# Patient Record
Sex: MALE | Race: WHITE | NOT HISPANIC OR LATINO | Employment: OTHER | URBAN - METROPOLITAN AREA
[De-identification: names, ages, dates, MRNs, and addresses within clinical notes are randomized per-mention and may not be internally consistent; named-entity substitution may affect disease eponyms.]

---

## 2023-06-09 ENCOUNTER — LAB (OUTPATIENT)
Dept: LAB | Facility: LAB | Age: 88
End: 2023-06-09
Payer: MEDICARE

## 2023-06-09 ENCOUNTER — OFFICE VISIT (OUTPATIENT)
Dept: PRIMARY CARE | Facility: CLINIC | Age: 88
End: 2023-06-09
Payer: MEDICARE

## 2023-06-09 VITALS
OXYGEN SATURATION: 88 % | WEIGHT: 161.8 LBS | HEART RATE: 74 BPM | BODY MASS INDEX: 23.89 KG/M2 | DIASTOLIC BLOOD PRESSURE: 62 MMHG | SYSTOLIC BLOOD PRESSURE: 113 MMHG

## 2023-06-09 DIAGNOSIS — Z00.00 WELLNESS EXAMINATION: Primary | ICD-10-CM

## 2023-06-09 DIAGNOSIS — I10 HYPERTENSION, UNSPECIFIED TYPE: ICD-10-CM

## 2023-06-09 DIAGNOSIS — D84.9 IMMUNOSUPPRESSED STATUS (MULTI): ICD-10-CM

## 2023-06-09 DIAGNOSIS — J44.9 CHRONIC OBSTRUCTIVE PULMONARY DISEASE, UNSPECIFIED COPD TYPE (MULTI): ICD-10-CM

## 2023-06-09 DIAGNOSIS — J84.9 ILD (INTERSTITIAL LUNG DISEASE) (MULTI): ICD-10-CM

## 2023-06-09 DIAGNOSIS — D69.6 LOW PLATELET COUNT (CMS-HCC): ICD-10-CM

## 2023-06-09 DIAGNOSIS — Z99.81 CHRONIC RESPIRATORY FAILURE WITH HYPOXIA, ON HOME O2 THERAPY (MULTI): ICD-10-CM

## 2023-06-09 DIAGNOSIS — J96.11 CHRONIC RESPIRATORY FAILURE WITH HYPOXIA, ON HOME O2 THERAPY (MULTI): ICD-10-CM

## 2023-06-09 LAB
ALANINE AMINOTRANSFERASE (SGPT) (U/L) IN SER/PLAS: 23 U/L (ref 10–52)
ALBUMIN (G/DL) IN SER/PLAS: 3.6 G/DL (ref 3.4–5)
ALKALINE PHOSPHATASE (U/L) IN SER/PLAS: 76 U/L (ref 33–136)
ANION GAP IN SER/PLAS: 11 MMOL/L (ref 10–20)
ASPARTATE AMINOTRANSFERASE (SGOT) (U/L) IN SER/PLAS: 26 U/L (ref 9–39)
BILIRUBIN TOTAL (MG/DL) IN SER/PLAS: 1.3 MG/DL (ref 0–1.2)
CALCIUM (MG/DL) IN SER/PLAS: 9.2 MG/DL (ref 8.6–10.6)
CARBON DIOXIDE, TOTAL (MMOL/L) IN SER/PLAS: 31 MMOL/L (ref 21–32)
CHLORIDE (MMOL/L) IN SER/PLAS: 104 MMOL/L (ref 98–107)
CREATININE (MG/DL) IN SER/PLAS: 0.93 MG/DL (ref 0.5–1.3)
ERYTHROCYTE DISTRIBUTION WIDTH (RATIO) BY AUTOMATED COUNT: 16 % (ref 11.5–14.5)
ERYTHROCYTE MEAN CORPUSCULAR HEMOGLOBIN CONCENTRATION (G/DL) BY AUTOMATED: 32.5 G/DL (ref 32–36)
ERYTHROCYTE MEAN CORPUSCULAR VOLUME (FL) BY AUTOMATED COUNT: 88 FL (ref 80–100)
ERYTHROCYTES (10*6/UL) IN BLOOD BY AUTOMATED COUNT: 5.51 X10E12/L (ref 4.5–5.9)
GFR MALE: 77 ML/MIN/1.73M2
GLUCOSE (MG/DL) IN SER/PLAS: 78 MG/DL (ref 74–99)
HEMATOCRIT (%) IN BLOOD BY AUTOMATED COUNT: 48.3 % (ref 41–52)
HEMOGLOBIN (G/DL) IN BLOOD: 15.7 G/DL (ref 13.5–17.5)
LEUKOCYTES (10*3/UL) IN BLOOD BY AUTOMATED COUNT: 10.5 X10E9/L (ref 4.4–11.3)
NRBC (PER 100 WBCS) BY AUTOMATED COUNT: 0 /100 WBC (ref 0–0)
PLATELETS (10*3/UL) IN BLOOD AUTOMATED COUNT: 274 X10E9/L (ref 150–450)
POTASSIUM (MMOL/L) IN SER/PLAS: 4.1 MMOL/L (ref 3.5–5.3)
PROTEIN TOTAL: 6 G/DL (ref 6.4–8.2)
SODIUM (MMOL/L) IN SER/PLAS: 142 MMOL/L (ref 136–145)
UREA NITROGEN (MG/DL) IN SER/PLAS: 17 MG/DL (ref 6–23)

## 2023-06-09 PROCEDURE — G0439 PPPS, SUBSEQ VISIT: HCPCS | Performed by: INTERNAL MEDICINE

## 2023-06-09 PROCEDURE — 1170F FXNL STATUS ASSESSED: CPT | Performed by: INTERNAL MEDICINE

## 2023-06-09 PROCEDURE — 80053 COMPREHEN METABOLIC PANEL: CPT

## 2023-06-09 PROCEDURE — 99214 OFFICE O/P EST MOD 30 MIN: CPT | Performed by: INTERNAL MEDICINE

## 2023-06-09 PROCEDURE — 1036F TOBACCO NON-USER: CPT | Performed by: INTERNAL MEDICINE

## 2023-06-09 PROCEDURE — 3074F SYST BP LT 130 MM HG: CPT | Performed by: INTERNAL MEDICINE

## 2023-06-09 PROCEDURE — 1159F MED LIST DOCD IN RCRD: CPT | Performed by: INTERNAL MEDICINE

## 2023-06-09 PROCEDURE — 99497 ADVNCD CARE PLAN 30 MIN: CPT | Performed by: INTERNAL MEDICINE

## 2023-06-09 PROCEDURE — 36415 COLL VENOUS BLD VENIPUNCTURE: CPT

## 2023-06-09 PROCEDURE — 3078F DIAST BP <80 MM HG: CPT | Performed by: INTERNAL MEDICINE

## 2023-06-09 PROCEDURE — 85027 COMPLETE CBC AUTOMATED: CPT

## 2023-06-09 PROCEDURE — 1160F RVW MEDS BY RX/DR IN RCRD: CPT | Performed by: INTERNAL MEDICINE

## 2023-06-09 RX ORDER — PREDNISONE 5 MG/1
2 TABLET ORAL
COMMUNITY
End: 2023-10-17 | Stop reason: ALTCHOICE

## 2023-06-09 RX ORDER — FUROSEMIDE 20 MG/1
1 TABLET ORAL DAILY PRN
COMMUNITY
Start: 2017-02-28

## 2023-06-09 RX ORDER — MOMETASONE FUROATE 220 UG/1
2 INHALANT RESPIRATORY (INHALATION)
COMMUNITY

## 2023-06-09 RX ORDER — PSYLLIUM SEED
PACKET (EA) ORAL
COMMUNITY
Start: 2015-08-18

## 2023-06-09 RX ORDER — NAPROXEN SODIUM 220 MG/1
81 TABLET, FILM COATED ORAL
COMMUNITY
Start: 2014-03-29

## 2023-06-09 RX ORDER — VIT C/E/ZN/COPPR/LUTEIN/ZEAXAN 250MG-90MG
CAPSULE ORAL
COMMUNITY
Start: 2021-03-24

## 2023-06-09 RX ORDER — ALBUTEROL SULFATE 0.83 MG/ML
SOLUTION RESPIRATORY (INHALATION)
COMMUNITY
Start: 2017-02-15

## 2023-06-09 RX ORDER — PANTOPRAZOLE SODIUM 40 MG/1
TABLET, DELAYED RELEASE ORAL
COMMUNITY
Start: 2018-07-24

## 2023-06-09 RX ORDER — ATORVASTATIN CALCIUM 40 MG/1
40 TABLET, FILM COATED ORAL
COMMUNITY
Start: 2015-08-18

## 2023-06-09 ASSESSMENT — ACTIVITIES OF DAILY LIVING (ADL)
DRESSING: INDEPENDENT
MANAGING_FINANCES: INDEPENDENT
DOING_HOUSEWORK: INDEPENDENT
GROCERY_SHOPPING: INDEPENDENT
TAKING_MEDICATION: INDEPENDENT
BATHING: INDEPENDENT

## 2023-06-09 ASSESSMENT — PATIENT HEALTH QUESTIONNAIRE - PHQ9
2. FEELING DOWN, DEPRESSED OR HOPELESS: NOT AT ALL
1. LITTLE INTEREST OR PLEASURE IN DOING THINGS: NOT AT ALL
SUM OF ALL RESPONSES TO PHQ9 QUESTIONS 1 AND 2: 0
SUM OF ALL RESPONSES TO PHQ9 QUESTIONS 1 AND 2: 0
2. FEELING DOWN, DEPRESSED OR HOPELESS: NOT AT ALL
1. LITTLE INTEREST OR PLEASURE IN DOING THINGS: NOT AT ALL

## 2023-06-09 NOTE — PROGRESS NOTES
Subjective   Patient ID: Donavan Hernández is a 89 y.o. male who presents for the following    MEDICARE WELLNESS 6/9/2023 AND FOLLOW UP     PHYSICAL 8/8/2022    Assessment/Plan      HTN: stable, continue with diet modifications     HLD: stable, continue on statin      CAD: stable, continue to check lipid panel and follow up with cardiology     COPD/ILD/CHronic hypoxia/immunosuppression: stable, continue home O2 3 liters, increase to 5L with exertion and continue 10mg prednisone as needed. continue on Symbicort. doing well.        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 will continue to monitor plts.      GERD: stable, continue PPI      labs reviewed with patient     patient wants full code at this time.   His wife is with him today and says that he does not want to be in a vegetative state ever.   Advanced care planning was discussed for 20 mins  DIscussed in details the options of advanced directives with patient in a voluntary nature. We discussed the differences between full code, comfort care arrest and comfort care. Patient was educated in detail regarding end-of-life care including options for hospice and palliative care. I provided details to patient regarding the importance of creating both a Living Will and Power of  documents and encouraged patient to complete these documents.    HPI    male with COPD, chronic hypoxia on 3 liters of oxygen, CAD s/p stent, htn, hld and the following      HTN: patient denies any headaches, blurred vision or dizziness. patient denies any stroke like symptoms     HLD: Patient denies any muscle aches and is tolerating statin therapy     CAD: denies any angina. denies any sob chest pain or pressure especially with exertion. 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 he follows with dr zuniga. he mentions that he does get hospitalized for lung disease. he quite in 1985 smoking, but he previously smoke 1ppd for 30 years.   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 patient went to dr kennedy for kyphoplasty but deferred on this procedure. patient currently does not take any pain relievers      ITP: required treatment back in 2013 with dr khan. he has been in remission for years.      GERD: Patient denies any acid reflux symptoms including epigastric burning, nausea or vomiting. Denies any dysphagia.     social: patient denies any smoking, drug or alcohol abuse     family history: dad passed 44 from lung cancer, mom passed old age     surgical history: s/p two lung surgeries looking for cancer which was not cancer, hernia on the right repair      HOSPITALIZATIONS     went [facility] and discharged on jan 18, 2023 with COPD exacerbation on levaquin and po steroids and did welll. he had DARRON which improved quickly during the hospitalization. he is doing well on his portable oxygen. no complaints or ROS at this time.   There were no vitals taken for this visit.  PHYSICAL EXAM   General appearance: Alert and in no acute distress. speech is clear and coherent  HEENT: Sclera and conjunctiva white, EOMI,  n. No head trauma  Neck: no carotid bruits or thyromegaly. no lymphadenopathy   Respiratory : No respiratory distress, normal respiratory rhythm and effort. Clear bilateral breath sounds. No wheezing or rhonchi.   Cardiovascular: heart rate regular, S1, S2. no murmurs. no Lower extremity edema  Skin inspection: Normal skin color and pigmentation, normal skin turgor and no visible rash,  induration, or cellulitis  MSK: 5/5 strength upper and lower extremities without gait abnormalities. no loss of muscle mass   Neuro: 2-12 CN grossly intact.  no slurred speech. no lateralizing deficit  Psychiatric Orientation: Oriented to person, place, and time. no depression, homicidal or suicidal thoughts, normal affect       REVIEW OF SYSTEMS     Constitutional: not feeling tired and no fever, chills or sweats.  no headache  Cardiovascular: no exertional chest pain, no palpitations, no lower extremity edema and no intermittent leg claudication.   Lungs: Denies shortness of breath, exertional dyspnea, wheezing  Gastrointestinal: no change in bowel habits, no diarrhea, no nausea,    Musculoskeletal: no myalgias, no muscle weakness    Neurological: no headaches, no seizures, no numbness, no lateralizing deficits and no fainting.   Psychiatric: no depression and no anxiety.   Urine: denies polyuria, hematuria, dysuria      Not on File    No current outpatient medications on file.     No current facility-administered medications for this visit.       Objective     No visits with results within 4 Month(s) from this visit.   Latest known visit with results is:   No results found for any previous visit.       Radiology: Reviewed imaging in powerchart.  No results found.    No family history on file.  Social History     Socioeconomic History    Marital status:      Spouse name: Not on file    Number of children: Not on file    Years of education: Not on file    Highest education level: Not on file   Occupational History    Not on file   Tobacco Use    Smoking status: Not on file    Smokeless tobacco: Not on file   Vaping Use    Vaping status: Not on file   Substance and Sexual Activity    Alcohol use: Not on file    Drug use: Not on file    Sexual activity: Not on file   Other Topics Concern    Not on file   Social History Narrative    Not on file     Social Determinants of Health     Financial Resource Strain:  Not on file   Food Insecurity: Not on file   Transportation Needs: Not on file   Physical Activity: Not on file   Stress: Not on file   Social Connections: Not on file   Intimate Partner Violence: Not on file   Housing Stability: Not on file     Past Medical History:   Diagnosis Date    Personal history of other malignant neoplasm of bronchus and lung     History of malignant neoplasm of lung     Past Surgical History:   Procedure Laterality Date    FOOT SURGERY  03/24/2021    Foot Surgery    HERNIA REPAIR  03/24/2021    Inguinal Hernia Repair    KNEE SURGERY  03/24/2021    Knee Surgery    OTHER SURGICAL HISTORY  03/24/2021    Colonoscopy    OTHER SURGICAL HISTORY  03/24/2021    Previous Stent Placement    OTHER SURGICAL HISTORY  03/24/2021    Thoracoscopy Of The Lungs And Pleural Space    TONSILLECTOMY  03/24/2021    Tonsillectomy       Charting was completed using voice recognition technology and may include unintended errors.

## 2023-08-29 ENCOUNTER — TELEPHONE (OUTPATIENT)
Dept: PRIMARY CARE | Facility: CLINIC | Age: 88
End: 2023-08-29
Payer: MEDICARE

## 2023-08-29 NOTE — TELEPHONE ENCOUNTER
Daughter called requesting [facility]  He was int [facility] and I am showing his potassium to be at 4.3 which is WNL.     No Rx will be sent  They can discuss further questions at the [facility] appt next week

## 2023-09-05 ENCOUNTER — OFFICE VISIT (OUTPATIENT)
Dept: PRIMARY CARE | Facility: CLINIC | Age: 88
End: 2023-09-05
Payer: MEDICARE

## 2023-09-05 VITALS
DIASTOLIC BLOOD PRESSURE: 58 MMHG | OXYGEN SATURATION: 87 % | HEIGHT: 69 IN | WEIGHT: 159.2 LBS | BODY MASS INDEX: 23.58 KG/M2 | HEART RATE: 78 BPM | TEMPERATURE: 98.3 F | SYSTOLIC BLOOD PRESSURE: 118 MMHG

## 2023-09-05 DIAGNOSIS — J44.9 CHRONIC OBSTRUCTIVE PULMONARY DISEASE, UNSPECIFIED COPD TYPE (MULTI): Primary | ICD-10-CM

## 2023-09-05 DIAGNOSIS — R60.0 LOWER EXTREMITY EDEMA: ICD-10-CM

## 2023-09-05 DIAGNOSIS — C44.92 SQUAMOUS CELL SKIN CANCER: ICD-10-CM

## 2023-09-05 DIAGNOSIS — R73.01 IMPAIRED FASTING GLUCOSE: ICD-10-CM

## 2023-09-05 DIAGNOSIS — N18.31 STAGE 3A CHRONIC KIDNEY DISEASE (MULTI): ICD-10-CM

## 2023-09-05 PROCEDURE — 1159F MED LIST DOCD IN RCRD: CPT | Performed by: INTERNAL MEDICINE

## 2023-09-05 PROCEDURE — 3074F SYST BP LT 130 MM HG: CPT | Performed by: INTERNAL MEDICINE

## 2023-09-05 PROCEDURE — 99496 TRANSJ CARE MGMT HIGH F2F 7D: CPT | Performed by: INTERNAL MEDICINE

## 2023-09-05 PROCEDURE — 1036F TOBACCO NON-USER: CPT | Performed by: INTERNAL MEDICINE

## 2023-09-05 PROCEDURE — 3078F DIAST BP <80 MM HG: CPT | Performed by: INTERNAL MEDICINE

## 2023-09-05 PROCEDURE — 1160F RVW MEDS BY RX/DR IN RCRD: CPT | Performed by: INTERNAL MEDICINE

## 2023-09-05 NOTE — PROGRESS NOTES
Subjective   Patient ID: Donavan Hernández is a 89 y.o. male who presents for the following    TRANSITIONS OF CARE here with Flor (daughter)    MEDICARE WELLNESS 6/9/2023 AND FOLLOW UP     PHYSICAL 8/8/2022    Assessment/Plan    COPD/ILD/CHronic hypoxia/immunosuppression: stable, continue home O2 3 liters, increase to 5L with exertion and continue 10mg prednisone as needed. continue on Symbicort. doing well.   Reduce to 10mg prednisone    Ckd stage 3 GFR 40-50s: Lower extremity edema: increase from 20mg to 40mg lasix for 5 days then reduce down     Squamous cell CA of the scalp. Dr King. [phone], will call on behalf of patient to get patient an appointment    Chronic back pain/ thoracic compression fractures: tylenol 1000mg q 8hrs    HTN: stable, continue with diet modifications     HLD: stable, continue on statin      CAD: stable, continue to check lipid panel and follow up with cardiology     history of ITP: stable and in remission for years now. will continue to monitor plts.      GERD: stable, continue PPI      labs reviewed with patient     patient wants full code at this time.      Discussed with patient about possibly going to assisted living or at least     patient was recently seen in the hospital and patient's hospital record has been reviewed with the patient including, but not limited to, discharge summary, labs, imaging, consultation notes (if pertinent). Ambulatory medication list and discharge hospitalization list has been compared and updated for changes.      HPI    male with COPD, chronic hypoxia on 3 liters of oxygen, CAD s/p stent, htn, hld and the following    @[facility] patient was hospitalized from 8/25/23-8/28/2023 for sepsis, acute on chronic hypoxemic= respiratory failure and COPD exacerbation patient was treated with steroids and antibiotics. He improved and was placed on a steroid taper 60mg,40, 20mg taper down every 3 days of prednisone and then to continue on 10mg prednisone    He does not  feel that he can take care of himself. Lower extremity edema nad he is on 20mg lasix       He can't seem to get into his appointment with dr lopez for his squamous cell cancer on his scalp     HTN: patient denies any headaches, blurred vision or dizziness. patient denies any stroke like symptoms     HLD: Patient denies any muscle aches and is tolerating statin therapy     CAD: denies any angina. denies any sob chest pain or pressure especially with exertion. 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 he follows with dr zuniga. he mentions that he does get hospitalized for lung disease. he quite in 1985 smoking, but he previously smoke 1ppd for 30 years.   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 patient went to dr kennedy for kyphoplasty but deferred on this procedure. patient currently does not take any pain relievers      ITP: required treatment back in 2013 with dr khan. he has been in remission for years.      GERD: Patient denies any acid reflux symptoms including epigastric burning, nausea or vomiting. Denies any dysphagia.     social: patient denies any smoking, drug or alcohol abuse     family history: dad passed 44 from lung cancer, mom passed old age     surgical history: s/p two lung surgeries looking for cancer which was not cancer, hernia on the right repair      HOSPITALIZATIONS     went [facility] and discharged on jan 18, 2023 with COPD exacerbation on levaquin and po steroids and did welll. he had DARRON which improved quickly during the hospitalization. he is doing well on his portable oxygen. no complaints or ROS at this time.   Visit Vitals  /58 (BP Location: Left arm, Patient Position: Sitting)   Pulse 78  "  Temp 36.8 °C (98.3 °F)   Ht 1.753 m (5' 9\")   Wt 72.2 kg (159 lb 3.2 oz)   SpO2 (!) 87% Comment: 3l nc   BMI 23.51 kg/m²   Smoking Status Former   BSA 1.88 m²     PHYSICAL EXAM   General appearance: Alert and in no acute distress. speech is clear and coherent  HEENT: Sclera and conjunctiva white, EOMI,  n. No head trauma  Neck: no carotid bruits or thyromegaly. no lymphadenopathy   Respiratory : No respiratory distress, normal respiratory rhythm and effort. Clear bilateral breath sounds. No wheezing or rhonchi.   Cardiovascular: heart rate regular, S1, S2. no murmurs. Trace to +1  Lower extremity edema  Skin inspection: Normal skin color and pigmentation, normal skin turgor and no visible rash, induration, or cellulitis  MSK: 3/5 strength upper and lower extremities with gait abnormalities. no loss of muscle mass   Neuro: 2-12 CN grossly intact.  no slurred speech. no lateralizing deficit  Psychiatric Orientation: Oriented to person, place, and time. no depression, homicidal or suicidal thoughts, normal affect       REVIEW OF SYSTEMS     Constitutional: not feeling tired and no fever, chills or sweats.  no headache  Cardiovascular: no exertional chest pain, no palpitations, no lower extremity edema and no intermittent leg claudication.   Lungs: Denies shortness of breath, exertional dyspnea, wheezing  Gastrointestinal: no change in bowel habits, no diarrhea, no nausea,    Musculoskeletal: no myalgias, no muscle weakness    Neurological: no headaches, no seizures, no numbness, no lateralizing deficits and no fainting.   Psychiatric: no depression and no anxiety.   Urine: denies polyuria, hematuria, dysuria      No Known Allergies    Current Outpatient Medications   Medication Sig Dispense Refill    albuterol 2.5 mg /3 mL (0.083 %) nebulizer solution Inhale.      aspirin 81 mg chewable tablet 1 tablet (81 mg).      atorvastatin (Lipitor) 40 mg tablet 1 tablet (40 mg).      furosemide (Lasix) 20 mg tablet Take 1 " tablet (20 mg) by mouth once daily as needed.      mometasone (Asmanex Twisthaler) 220 mcg/ actuation (14) inhaler Inhale 2 puffs once daily. Rinse mouth with water after use to reduce aftertaste and incidence of candidiasis. Do not swallow.      pantoprazole (ProtoNix) 40 mg EC tablet Take by mouth.      predniSONE 5 mg tablets,dose pack Take 2 tablets by mouth.      psyllium husk (MetamuciL) 3.4 gram/5.4 gram powder Take by mouth.      tiotropium-olodateroL (Stiolto Respimat) 2.5-2.5 mcg/actuation mist inhaler       vit C,R-Gz-xqxtd-lutein-zeaxan (PreserVision AREDS-2) 250-90-40-1 mg tablet,chewable Chew.       No current facility-administered medications for this visit.       Objective     Lab on 06/09/2023   Component Date Value Ref Range Status    WBC 06/09/2023 10.5  4.4 - 11.3 x10E9/L Final    nRBC 06/09/2023 0.0  0.0 - 0.0 /100 WBC Final    RBC 06/09/2023 5.51  4.50 - 5.90 x10E12/L Final    Hemoglobin 06/09/2023 15.7  13.5 - 17.5 g/dL Final    Hematocrit 06/09/2023 48.3  41.0 - 52.0 % Final    MCV 06/09/2023 88  80 - 100 fL Final    MCHC 06/09/2023 32.5  32.0 - 36.0 g/dL Final    Platelets 06/09/2023 274  150 - 450 x10E9/L Final    RDW 06/09/2023 16.0 (H)  11.5 - 14.5 % Final    Glucose 06/09/2023 78  74 - 99 mg/dL Final    Sodium 06/09/2023 142  136 - 145 mmol/L Final    Potassium 06/09/2023 4.1  3.5 - 5.3 mmol/L Final    Chloride 06/09/2023 104  98 - 107 mmol/L Final    Bicarbonate 06/09/2023 31  21 - 32 mmol/L Final    Anion Gap 06/09/2023 11  10 - 20 mmol/L Final    Urea Nitrogen 06/09/2023 17  6 - 23 mg/dL Final    Creatinine 06/09/2023 0.93  0.50 - 1.30 mg/dL Final    GFR MALE 06/09/2023 77  >90 mL/min/1.73m2 Final    Calcium 06/09/2023 9.2  8.6 - 10.6 mg/dL Final    Albumin 06/09/2023 3.6  3.4 - 5.0 g/dL Final    Alkaline Phosphatase 06/09/2023 76  33 - 136 U/L Final    Total Protein 06/09/2023 6.0 (L)  6.4 - 8.2 g/dL Final    AST 06/09/2023 26  9 - 39 U/L Final    Total Bilirubin 06/09/2023 1.3 (H)   0.0 - 1.2 mg/dL Final    ALT (SGPT) 2023 23  10 - 52 U/L Final       Radiology: Reviewed imaging in powerchart.  No results found.    No family history on file.  Social History     Socioeconomic History    Marital status:      Spouse name: None    Number of children: None    Years of education: None    Highest education level: None   Occupational History    None   Tobacco Use    Smoking status: Former     Packs/day: 1     Types: Cigarettes     Quit date:      Years since quittin.7    Smokeless tobacco: Former   Substance and Sexual Activity    Alcohol use: Never    Drug use: Never    Sexual activity: None   Other Topics Concern    None   Social History Narrative    None     Social Determinants of Health     Financial Resource Strain: Not on file   Food Insecurity: Not on file   Transportation Needs: Not on file   Physical Activity: Not on file   Stress: Not on file   Social Connections: Not on file   Intimate Partner Violence: Not on file   Housing Stability: Not on file     Past Medical History:   Diagnosis Date    Personal history of other malignant neoplasm of bronchus and lung     History of malignant neoplasm of lung     Past Surgical History:   Procedure Laterality Date    FOOT SURGERY  2021    Foot Surgery    HERNIA REPAIR  2021    Inguinal Hernia Repair    KNEE SURGERY  2021    Knee Surgery    OTHER SURGICAL HISTORY  2021    Colonoscopy    OTHER SURGICAL HISTORY  2021    Previous Stent Placement    OTHER SURGICAL HISTORY  2021    Thoracoscopy Of The Lungs And Pleural Space    TONSILLECTOMY  2021    Tonsillectomy       Charting was completed using voice recognition technology and may include unintended errors.

## 2023-09-08 ENCOUNTER — TELEPHONE (OUTPATIENT)
Dept: PRIMARY CARE | Facility: CLINIC | Age: 88
End: 2023-09-08
Payer: MEDICARE

## 2023-09-08 NOTE — TELEPHONE ENCOUNTER
[facility] called asking if Dr. Owens will follow patient for PT services. They would like a call back with answer [phone].

## 2023-09-11 ENCOUNTER — TELEPHONE (OUTPATIENT)
Dept: PRIMARY CARE | Facility: CLINIC | Age: 88
End: 2023-09-11
Payer: MEDICARE

## 2023-09-12 ENCOUNTER — TELEPHONE (OUTPATIENT)
Dept: PRIMARY CARE | Facility: CLINIC | Age: 88
End: 2023-09-12
Payer: MEDICARE

## 2023-09-12 NOTE — TELEPHONE ENCOUNTER
Dona from [facility] called with concern for patients pain that seems to be pelvic region. Pain is noticed more with positional changes. Looks like pt had pelvic xray done yesterday, she is seeing patient today. Called to see if any recommendations or concerns for pain.

## 2023-09-13 ENCOUNTER — TELEPHONE (OUTPATIENT)
Dept: PRIMARY CARE | Facility: CLINIC | Age: 88
End: 2023-09-13
Payer: MEDICARE

## 2023-09-13 DIAGNOSIS — B37.81 THRUSH OF MOUTH AND ESOPHAGUS (MULTI): ICD-10-CM

## 2023-09-13 DIAGNOSIS — B37.0 THRUSH OF MOUTH AND ESOPHAGUS (MULTI): ICD-10-CM

## 2023-09-13 RX ORDER — NYSTATIN 100000 [USP'U]/ML
10 SUSPENSION ORAL 4 TIMES DAILY
Qty: 280 ML | Refills: 0 | Status: SHIPPED
Start: 2023-09-13 | End: 2023-09-15 | Stop reason: SDUPTHER

## 2023-09-13 NOTE — TELEPHONE ENCOUNTER
Informed Dona pt already had xray complete- informed her pt can see Dr. Yañez for pain or Dr. Owens in next 2-3 weeks. She stated she has seen improvement in pt since last week to this week but she will let the pt know.

## 2023-09-13 NOTE — TELEPHONE ENCOUNTER
PT BANGED HIS FOREARM ON WALKER. HE HAS A SKIN TEAR AND IT IS BEING TREATED WITH AQUAPHOR, SOAP & WATER, AND NON STICK WRAP. IS THIS OK FOR WOUND CARE?  ALSO COMPLAINING OF SORENESS IN MOUTH, BUT DOES NOT SEE ANYTHING.    CALL LONA AT  HC: [phone]  CALL PT ALSO TO LET HIM KNOW.

## 2023-09-13 NOTE — TELEPHONE ENCOUNTER
Called msg for home health nurse (Brando) indicating wound care orders were appropriate per Dr Owens r/t skin tear    Called pt and his mouth is burning when eating acidic foods  He does use inhalers and I will request antifungal  Pt understands that provider needs to approve before sent tp pharmacy

## 2023-09-15 RX ORDER — NYSTATIN 100000 [USP'U]/ML
10 SUSPENSION ORAL 4 TIMES DAILY
Qty: 280 ML | Refills: 0 | Status: SHIPPED | OUTPATIENT
Start: 2023-09-15 | End: 2023-11-14

## 2023-09-15 NOTE — TELEPHONE ENCOUNTER
Please resend script for mycostatin swish and spit to [facility]  This was sent to [facility]

## 2023-10-11 ENCOUNTER — TELEPHONE (OUTPATIENT)
Dept: PRIMARY CARE | Facility: CLINIC | Age: 88
End: 2023-10-11
Payer: MEDICARE

## 2023-10-11 NOTE — TELEPHONE ENCOUNTER
OLY made for pt who was discharged on 10/10/23  He received his meds and has no immediate need to see or talk to the attending physician. Pt is stable at this time  Appt made for Oct 17 2023 at 11am

## 2023-10-17 ENCOUNTER — TELEPHONE (OUTPATIENT)
Dept: PRIMARY CARE | Facility: CLINIC | Age: 88
End: 2023-10-17

## 2023-10-17 ENCOUNTER — OFFICE VISIT (OUTPATIENT)
Dept: PRIMARY CARE | Facility: CLINIC | Age: 88
End: 2023-10-17
Payer: MEDICARE

## 2023-10-17 VITALS
TEMPERATURE: 97.1 F | HEART RATE: 84 BPM | OXYGEN SATURATION: 96 % | SYSTOLIC BLOOD PRESSURE: 78 MMHG | DIASTOLIC BLOOD PRESSURE: 50 MMHG

## 2023-10-17 DIAGNOSIS — J44.9 CHRONIC OBSTRUCTIVE PULMONARY DISEASE, UNSPECIFIED COPD TYPE (MULTI): Primary | ICD-10-CM

## 2023-10-17 PROCEDURE — 99497 ADVNCD CARE PLAN 30 MIN: CPT | Performed by: INTERNAL MEDICINE

## 2023-10-17 PROCEDURE — 1160F RVW MEDS BY RX/DR IN RCRD: CPT | Performed by: INTERNAL MEDICINE

## 2023-10-17 PROCEDURE — 99495 TRANSJ CARE MGMT MOD F2F 14D: CPT | Performed by: INTERNAL MEDICINE

## 2023-10-17 PROCEDURE — 3078F DIAST BP <80 MM HG: CPT | Performed by: INTERNAL MEDICINE

## 2023-10-17 PROCEDURE — 1159F MED LIST DOCD IN RCRD: CPT | Performed by: INTERNAL MEDICINE

## 2023-10-17 PROCEDURE — 1036F TOBACCO NON-USER: CPT | Performed by: INTERNAL MEDICINE

## 2023-10-17 PROCEDURE — 3074F SYST BP LT 130 MM HG: CPT | Performed by: INTERNAL MEDICINE

## 2023-10-17 RX ORDER — PREDNISONE 20 MG/1
TABLET ORAL
Qty: 18 TABLET | Refills: 1 | Status: SHIPPED | OUTPATIENT
Start: 2023-10-17

## 2023-10-17 RX ORDER — AZITHROMYCIN 250 MG/1
TABLET, FILM COATED ORAL
Qty: 6 TABLET | Refills: 0 | Status: SHIPPED | OUTPATIENT
Start: 2023-10-17 | End: 2023-10-22

## 2023-10-17 NOTE — PROGRESS NOTES
Subjective   Patient ID: Donavan Hernández is a 89 y.o. male who presents for the following    TRANSITIONS OF CARE here with Diana (verena RN present)   Flor (daughter - not present)    MEDICARE WELLNESS 6/9/2023       PHYSICAL 8/8/2022     Assessment/Plan   Low bp but not symptomatic at all.   Midodrine may need to be necessary in the near future.      COPD/ILD/CHronic hypoxia/immunosuppression: stable, continue home O2 3 liters, increase to 5L with exertion and continue 10mg prednisone as needed. continue on Symbicort. doing well.   Reduce to 10mg prednisone    Ckd stage 3 GFR 40-50s: Lower extremity edema: increase from 20mg to 40mg lasix for 5 days then reduce down     Squamous cell CA of the scalp. Dr King. [phone], will call on behalf of patient to get patient an appointment    Chronic back pain/ thoracic compression fractures: tylenol 1000mg q 8hrs    HTN: stable, continue with diet modifications     HLD: stable, continue on statin      CAD: stable, continue to check lipid panel and follow up with cardiology     history of ITP: stable and in remission for years now. will continue to monitor plts.      GERD: stable, continue PPI      labs reviewed with patient     patient wants DNR CC at this time.      Advanced care planning was discussed for 20 mins  DIscussed in details the options of advanced directives with patient in a voluntary nature. We discussed the differences between full code, comfort care arrest and comfort care. Patient was educated in detail regarding end-of-life care including options for hospice and palliative care. I provided details to patient regarding the importance of creating both a Living Will and Power of  documents and encouraged patient to complete these documents.       patient was recently seen in the hospital and patient's hospital record has been reviewed with the patient including, but not limited to, discharge summary, labs, imaging, consultation notes (if pertinent).  Ambulatory medication list and discharge hospitalization list has been compared and updated for changes.      HPI    male with COPD, chronic hypoxia on 3 liters of oxygen, CAD s/p stent, htn, hld and the following    @ [facility] patient was hospitalized from October 5 to October 10, 2023 for acute exacerbation of his COPD.  He did quite well on a steroid taper.  He did not qualify for BiPAP upon discharge but did require it during his hospitalization.  He was to continue on 60 mg of prednisone for 3 days 40 mg for prednisone for 3 days then 20 mg for prednisone for 3 days and then call follow-up closely.He does not feel that he can take care of himself. Lower extremity edema nad he is on 20mg lasix       He can't seem to get into his appointment with dr lopez for his squamous cell cancer on his scalp    BP is low but denies any syncope or near syncope. Denies any dizziness.      HLD: Patient denies any muscle aches and is tolerating statin therapy     CAD: denies any angina. denies any sob chest pain or pressure especially with exertion. 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 he follows with dr zuniga. he mentions that he does get hospitalized for lung disease. he quite in 1985 smoking, but he previously smoke 1ppd for 30 years.   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 patient went to dr kennedy for kyphoplasty but deferred on this procedure. patient currently does not take any pain relievers      ITP: required treatment back in 2013 with dr khan. he has been in remission for years.      GERD: Patient denies any acid reflux symptoms including epigastric burning, nausea or vomiting. Denies any  dysphagia.     social: patient denies any smoking, drug or alcohol abuse     family history: dad passed 44 from lung cancer, mom passed old age     surgical history: s/p two lung surgeries looking for cancer which was not cancer, hernia on the right repair      HOSPITALIZATIONS  @[facility] patient was hospitalized from 8/25/23-8/28/2023 for sepsis, acute on chronic hypoxemic= respiratory failure and COPD exacerbation patient was treated with steroids and antibiotics. He improved and was placed on a steroid taper 60mg,40, 20mg taper down every 3 days of prednisone and then to continue on 10mg prednisone    @ [facility] and discharged on jan 18, 2023 with COPD exacerbation on levaquin and po steroids and did welll. he had DARRON which improved quickly during the hospitalization. he is doing well on his portable oxygen. no complaints or ROS at this time.   Visit Vitals  BP 78/50   Pulse 84   Temp 36.2 °C (97.1 °F)   SpO2 96% Comment: ON 4l nc   Smoking Status Former     PHYSICAL EXAM   General appearance: Alert and in no acute distress. speech is clear and coherent  HEENT: Sclera and conjunctiva white, EOMI,  n. No head trauma    Respiratory : No respiratory distress, normal respiratory rhythm and effort. Clear bilateral breath sounds. No wheezing or rhonchi.   Cardiovascular: heart rate regular, S1, S2. no murmurs. Trace to +1  Lower extremity edema  Skin inspection: Normal skin color and pigmentation, normal skin turgor and no visible rash, induration, or cellulitis  MSK: 3/5 strength upper and lower extremities with gait abnormalities. no loss of muscle mass   Neuro: 2-12 CN grossly intact.  no slurred speech. no lateralizing deficit  Psychiatric Orientation: Oriented to person, place, and time. no depression, homicidal or suicidal thoughts, normal affect       REVIEW OF SYSTEMS     Constitutional: not feeling tired and no fever, chills or sweats.  no headache  Cardiovascular: no exertional chest pain, no palpitations, no lower  extremity edema and no intermittent leg claudication.   Lungs: Denies shortness of breath, exertional dyspnea, wheezing  Gastrointestinal: no change in bowel habits, no diarrhea, no nausea,    Musculoskeletal: no myalgias, no muscle weakness    Neurological: no headaches, no seizures, no numbness, no lateralizing deficits and no fainting.   Psychiatric: no depression and no anxiety.   Urine: denies polyuria, hematuria, dysuria      No Known Allergies    Current Outpatient Medications   Medication Sig Dispense Refill    albuterol 2.5 mg /3 mL (0.083 %) nebulizer solution Inhale.      aspirin 81 mg chewable tablet 1 tablet (81 mg).      atorvastatin (Lipitor) 40 mg tablet 1 tablet (40 mg).      furosemide (Lasix) 20 mg tablet Take 1 tablet (20 mg) by mouth once daily as needed.      mometasone (Asmanex Twisthaler) 220 mcg/ actuation (14) inhaler Inhale 2 puffs once daily. Rinse mouth with water after use to reduce aftertaste and incidence of candidiasis. Do not swallow.      nystatin (Mycostatin) 100,000 unit/mL suspension Take 10 mL (1,000,000 Units) by mouth 4 times a day. Swish in mouth and spit out. 280 mL 0    pantoprazole (ProtoNix) 40 mg EC tablet Take by mouth.      predniSONE 5 mg tablets,dose pack Take 2 tablets by mouth.      psyllium husk (MetamuciL) 3.4 gram/5.4 gram powder Take by mouth.      tiotropium-olodateroL (Stiolto Respimat) 2.5-2.5 mcg/actuation mist inhaler       vit C,Z-Ei-yixmr-lutein-zeaxan (PreserVision AREDS-2) 250-90-40-1 mg tablet,chewable Chew.       No current facility-administered medications for this visit.       Objective     No visits with results within 4 Month(s) from this visit.   Latest known visit with results is:   Lab on 06/09/2023   Component Date Value Ref Range Status    WBC 06/09/2023 10.5  4.4 - 11.3 x10E9/L Final    nRBC 06/09/2023 0.0  0.0 - 0.0 /100 WBC Final    RBC 06/09/2023 5.51  4.50 - 5.90 x10E12/L Final    Hemoglobin 06/09/2023 15.7  13.5 - 17.5 g/dL Final     Hematocrit 2023 48.3  41.0 - 52.0 % Final    MCV 2023 88  80 - 100 fL Final    MCHC 2023 32.5  32.0 - 36.0 g/dL Final    Platelets 2023 274  150 - 450 x10E9/L Final    RDW 2023 16.0 (H)  11.5 - 14.5 % Final    Glucose 2023 78  74 - 99 mg/dL Final    Sodium 2023 142  136 - 145 mmol/L Final    Potassium 2023 4.1  3.5 - 5.3 mmol/L Final    Chloride 2023 104  98 - 107 mmol/L Final    Bicarbonate 2023 31  21 - 32 mmol/L Final    Anion Gap 2023 11  10 - 20 mmol/L Final    Urea Nitrogen 2023 17  6 - 23 mg/dL Final    Creatinine 2023 0.93  0.50 - 1.30 mg/dL Final    GFR MALE 2023 77  >90 mL/min/1.73m2 Final    Calcium 2023 9.2  8.6 - 10.6 mg/dL Final    Albumin 2023 3.6  3.4 - 5.0 g/dL Final    Alkaline Phosphatase 2023 76  33 - 136 U/L Final    Total Protein 2023 6.0 (L)  6.4 - 8.2 g/dL Final    AST 2023 26  9 - 39 U/L Final    Total Bilirubin 2023 1.3 (H)  0.0 - 1.2 mg/dL Final    ALT (SGPT) 2023 23  10 - 52 U/L Final       Radiology: Reviewed imaging in powerchart.  No results found.    No family history on file.  Social History     Socioeconomic History    Marital status:      Spouse name: Not on file    Number of children: Not on file    Years of education: Not on file    Highest education level: Not on file   Occupational History    Not on file   Tobacco Use    Smoking status: Former     Packs/day: 1     Types: Cigarettes     Quit date:      Years since quittin.8    Smokeless tobacco: Former   Substance and Sexual Activity    Alcohol use: Never    Drug use: Never    Sexual activity: Not on file   Other Topics Concern    Not on file   Social History Narrative    Not on file     Social Determinants of Health     Financial Resource Strain: Not on file   Food Insecurity: Not on file   Transportation Needs: Not on file   Physical Activity: Not on file   Stress: Not on file   Social  Connections: Not on file   Intimate Partner Violence: Not on file   Housing Stability: Not on file     Past Medical History:   Diagnosis Date    Personal history of other malignant neoplasm of bronchus and lung     History of malignant neoplasm of lung     Past Surgical History:   Procedure Laterality Date    FOOT SURGERY  03/24/2021    Foot Surgery    HERNIA REPAIR  03/24/2021    Inguinal Hernia Repair    KNEE SURGERY  03/24/2021    Knee Surgery    OTHER SURGICAL HISTORY  03/24/2021    Colonoscopy    OTHER SURGICAL HISTORY  03/24/2021    Previous Stent Placement    OTHER SURGICAL HISTORY  03/24/2021    Thoracoscopy Of The Lungs And Pleural Space    TONSILLECTOMY  03/24/2021    Tonsillectomy       Charting was completed using voice recognition technology and may include unintended errors.

## 2023-10-17 NOTE — TELEPHONE ENCOUNTER
TCM, discharged on 10/101/23, patient did receive their discharge medications.  Patient does not require immediate attention of the attending at this time and is scheduled for an appointment on ocT 17 2023

## 2023-10-24 ENCOUNTER — TELEPHONE (OUTPATIENT)
Dept: PRIMARY CARE | Facility: CLINIC | Age: 88
End: 2023-10-24

## 2023-10-24 NOTE — TELEPHONE ENCOUNTER
PT GOES TO [facility]. THEY WANT TO DO PULMONARY REHAB VIRTUALLY. PT DOES NOT WANT TO DO UNLESS OK WITH DR CAMPBELL. ARM RAISES, LEG EXTENTSIONS, BREATHING TECHNIQUES

## 2023-10-25 NOTE — TELEPHONE ENCOUNTER
"Dr Owens approved this for the pt and I tried calling him this evening and his SO answered the phone and said, \"we are eating!\" And hung up.      " Stage 2: Additional Anesthesia Type: 1% lidocaine with epinephrine

## 2023-11-15 ENCOUNTER — OFFICE VISIT (OUTPATIENT)
Dept: PRIMARY CARE | Facility: CLINIC | Age: 88
End: 2023-11-15
Payer: MEDICARE

## 2023-11-15 VITALS
TEMPERATURE: 97 F | WEIGHT: 159 LBS | BODY MASS INDEX: 23.55 KG/M2 | HEART RATE: 86 BPM | HEIGHT: 69 IN | OXYGEN SATURATION: 90 % | SYSTOLIC BLOOD PRESSURE: 84 MMHG | DIASTOLIC BLOOD PRESSURE: 40 MMHG

## 2023-11-15 DIAGNOSIS — N18.31 STAGE 3A CHRONIC KIDNEY DISEASE (MULTI): ICD-10-CM

## 2023-11-15 DIAGNOSIS — J84.9 ILD (INTERSTITIAL LUNG DISEASE) (MULTI): ICD-10-CM

## 2023-11-15 DIAGNOSIS — I10 HYPERTENSION, UNSPECIFIED TYPE: Primary | ICD-10-CM

## 2023-11-15 DIAGNOSIS — C44.92 SQUAMOUS CELL SKIN CANCER: ICD-10-CM

## 2023-11-15 DIAGNOSIS — J96.11 CHRONIC RESPIRATORY FAILURE WITH HYPOXIA, ON HOME O2 THERAPY (MULTI): ICD-10-CM

## 2023-11-15 DIAGNOSIS — J44.9 CHRONIC OBSTRUCTIVE PULMONARY DISEASE, UNSPECIFIED COPD TYPE (MULTI): ICD-10-CM

## 2023-11-15 DIAGNOSIS — Z99.81 CHRONIC RESPIRATORY FAILURE WITH HYPOXIA, ON HOME O2 THERAPY (MULTI): ICD-10-CM

## 2023-11-15 PROCEDURE — 1159F MED LIST DOCD IN RCRD: CPT | Performed by: INTERNAL MEDICINE

## 2023-11-15 PROCEDURE — 1036F TOBACCO NON-USER: CPT | Performed by: INTERNAL MEDICINE

## 2023-11-15 PROCEDURE — 3078F DIAST BP <80 MM HG: CPT | Performed by: INTERNAL MEDICINE

## 2023-11-15 PROCEDURE — 3074F SYST BP LT 130 MM HG: CPT | Performed by: INTERNAL MEDICINE

## 2023-11-15 PROCEDURE — 1160F RVW MEDS BY RX/DR IN RCRD: CPT | Performed by: INTERNAL MEDICINE

## 2023-11-15 PROCEDURE — 99214 OFFICE O/P EST MOD 30 MIN: CPT | Performed by: INTERNAL MEDICINE

## 2023-11-15 NOTE — PROGRESS NOTES
Subjective   Patient ID: Donavan Hernández is a 89 y.o. male who presents for the following     Chronic disease follow up  Diana (duaghter RN - present)   Flor (daughter - not present)    MEDICARE WELLNESS 6/9/2023       PHYSICAL 8/8/2022     Assessment/Plan   Low bp but not symptomatic at all.   Midodrine may need to be necessary in the near future.      COPD/ILD/CHronic hypoxia/immunosuppression: stable, continue home O2 3 liters, increase to 5L with exertion and continue 10mg prednisone as needed. continue on Symbicort. doing well.   Reduce to 10mg prednisone    Ckd stage 3 GFR 40-50s: Lower extremity edema: increase from 20mg to 40mg lasix for 5 days then reduce down 20mg daily     Squamous cell CA of the scalp. Please follow up with Dr King. [phone],      Chronic back pain/ thoracic compression fractures: tylenol 1000mg q 8hrs    HTN: stable, continue with diet modifications     HLD: stable, continue on statin      CAD: stable, continue to check lipid panel and follow up with cardiology     history of ITP: stable and in remission for years now. will continue to monitor plts.      GERD: stable, continue PPI      labs reviewed with patient     patient wants DNR CC at this time.            HPI    male with COPD, chronic hypoxia on 3 liters of oxygen, CAD s/p stent, htn, hld and the following    BP is low but denies any syncope or near syncope. Denies any dizziness.      HLD: Patient denies any muscle aches and is tolerating statin therapy     CAD: denies any angina. denies any sob chest pain or pressure especially with exertion. 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 he follows with dr zuniga. he mentions that he does get hospitalized for lung disease. he quite in 1985 smoking, but he previously smoke 1ppd for 30 years.    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 patient went to dr kennedy for kyphoplasty but deferred on this procedure. patient currently does not take any pain relievers      ITP: required treatment back in 2013 with dr khan. he has been in remission for years.      GERD: Patient denies any acid reflux symptoms including epigastric burning, nausea or vomiting. Denies any dysphagia.     social: patient denies any smoking, drug or alcohol abuse     family history: dad passed 44 from lung cancer, mom passed old age     surgical history: s/p two lung surgeries looking for cancer which was not cancer, hernia on the right repair      HOSPITALIZATIONS  @ [facility] patient was hospitalized from October 5 to October 10, 2023 for acute exacerbation of his COPD.  He did quite well on a steroid taper.  He did not qualify for BiPAP upon discharge but did require it during his hospitalization.  He was to continue on 60 mg of prednisone for 3 days 40 mg for prednisone for 3 days then 20 mg for prednisone for 3 days and then call follow-up closely.He does not feel that he can take care of himself. Lower extremity edema and he is on 20mg lasix       @[facility] patient was hospitalized from 8/25/23-8/28/2023 for sepsis, acute on chronic hypoxemic= respiratory failure and COPD exacerbation patient was treated with steroids and antibiotics. He improved and was placed on a steroid taper 60mg,40, 20mg taper down every 3 days of prednisone and then to continue on 10mg prednisone    @ [facility] and discharged on jan 18, 2023 with COPD exacerbation on levaquin and po steroids and did welll. he had DARRON which improved quickly during the hospitalization. he is doing well on his portable oxygen. no complaints or ROS at this time.   Visit Vitals  Smoking Status Former     PHYSICAL EXAM   General appearance: Alert and in no acute distress.  speech is clear and coherent  HEENT: Sclera and conjunctiva white, EOMI,  n. No head trauma    Respiratory : No respiratory distress, normal respiratory rhythm and effort. Clear bilateral breath sounds. No wheezing or rhonchi.   Cardiovascular: heart rate regular, S1, S2. no murmurs. Trace to +1  Lower extremity edema  Skin inspection: Normal skin color and pigmentation, normal skin turgor and no visible rash, induration, or cellulitis  MSK: 3/5 strength upper and lower extremities with gait abnormalities. no loss of muscle mass   Neuro: 2-12 CN grossly intact.  no slurred speech. no lateralizing deficit  Psychiatric Orientation: Oriented to person, place, and time. no depression, homicidal or suicidal thoughts, normal affect       REVIEW OF SYSTEMS     Constitutional: not feeling tired and no fever, chills or sweats.  no headache  Cardiovascular: no exertional chest pain, no palpitations, no lower extremity edema and no intermittent leg claudication.   Lungs: Denies shortness of breath, exertional dyspnea, wheezing  Gastrointestinal: no change in bowel habits, no diarrhea, no nausea,    Musculoskeletal: no myalgias, no muscle weakness    Neurological: no headaches, no seizures, no numbness, no lateralizing deficits and no fainting.   Psychiatric: no depression and no anxiety.   Urine: denies polyuria, hematuria, dysuria      No Known Allergies    Current Outpatient Medications   Medication Sig Dispense Refill    albuterol 2.5 mg /3 mL (0.083 %) nebulizer solution Inhale.      aspirin 81 mg chewable tablet 1 tablet (81 mg).      atorvastatin (Lipitor) 40 mg tablet 1 tablet (40 mg).      furosemide (Lasix) 20 mg tablet Take 1 tablet (20 mg) by mouth once daily as needed.      mometasone (Asmanex Twisthaler) 220 mcg/ actuation (14) inhaler Inhale 2 puffs once daily. Rinse mouth with water after use to reduce aftertaste and incidence of candidiasis. Do not swallow.      pantoprazole (ProtoNix) 40 mg EC tablet Take by  mouth.      predniSONE (Deltasone) 20 mg tablet 3 tablets for 3 days; 2 tabs for 3 days 1 tab for 3 days 18 tablet 1    psyllium husk (MetamuciL) 3.4 gram/5.4 gram powder Take by mouth.      tiotropium-olodateroL (Stiolto Respimat) 2.5-2.5 mcg/actuation mist inhaler       vit C,Z-Gu-ajobl-lutein-zeaxan (PreserVision AREDS-2) 250-90-40-1 mg tablet,chewable Chew.       No current facility-administered medications for this visit.       Objective     No visits with results within 4 Month(s) from this visit.   Latest known visit with results is:   Lab on 06/09/2023   Component Date Value Ref Range Status    WBC 06/09/2023 10.5  4.4 - 11.3 x10E9/L Final    nRBC 06/09/2023 0.0  0.0 - 0.0 /100 WBC Final    RBC 06/09/2023 5.51  4.50 - 5.90 x10E12/L Final    Hemoglobin 06/09/2023 15.7  13.5 - 17.5 g/dL Final    Hematocrit 06/09/2023 48.3  41.0 - 52.0 % Final    MCV 06/09/2023 88  80 - 100 fL Final    MCHC 06/09/2023 32.5  32.0 - 36.0 g/dL Final    Platelets 06/09/2023 274  150 - 450 x10E9/L Final    RDW 06/09/2023 16.0 (H)  11.5 - 14.5 % Final    Glucose 06/09/2023 78  74 - 99 mg/dL Final    Sodium 06/09/2023 142  136 - 145 mmol/L Final    Potassium 06/09/2023 4.1  3.5 - 5.3 mmol/L Final    Chloride 06/09/2023 104  98 - 107 mmol/L Final    Bicarbonate 06/09/2023 31  21 - 32 mmol/L Final    Anion Gap 06/09/2023 11  10 - 20 mmol/L Final    Urea Nitrogen 06/09/2023 17  6 - 23 mg/dL Final    Creatinine 06/09/2023 0.93  0.50 - 1.30 mg/dL Final    GFR MALE 06/09/2023 77  >90 mL/min/1.73m2 Final    Calcium 06/09/2023 9.2  8.6 - 10.6 mg/dL Final    Albumin 06/09/2023 3.6  3.4 - 5.0 g/dL Final    Alkaline Phosphatase 06/09/2023 76  33 - 136 U/L Final    Total Protein 06/09/2023 6.0 (L)  6.4 - 8.2 g/dL Final    AST 06/09/2023 26  9 - 39 U/L Final    Total Bilirubin 06/09/2023 1.3 (H)  0.0 - 1.2 mg/dL Final    ALT (SGPT) 06/09/2023 23  10 - 52 U/L Final       Radiology: Reviewed imaging in powerchart.  No results found.    No family  history on file.  Social History     Socioeconomic History    Marital status:      Spouse name: Not on file    Number of children: Not on file    Years of education: Not on file    Highest education level: Not on file   Occupational History    Not on file   Tobacco Use    Smoking status: Former     Packs/day: 1     Types: Cigarettes     Quit date:      Years since quittin.8    Smokeless tobacco: Former   Substance and Sexual Activity    Alcohol use: Never    Drug use: Never    Sexual activity: Not on file   Other Topics Concern    Not on file   Social History Narrative    Not on file     Social Determinants of Health     Financial Resource Strain: Not on file   Food Insecurity: Not on file   Transportation Needs: Not on file   Physical Activity: Not on file   Stress: Not on file   Social Connections: Not on file   Intimate Partner Violence: Not on file   Housing Stability: Not on file     Past Medical History:   Diagnosis Date    Personal history of other malignant neoplasm of bronchus and lung     History of malignant neoplasm of lung     Past Surgical History:   Procedure Laterality Date    FOOT SURGERY  2021    Foot Surgery    HERNIA REPAIR  2021    Inguinal Hernia Repair    KNEE SURGERY  2021    Knee Surgery    OTHER SURGICAL HISTORY  2021    Colonoscopy    OTHER SURGICAL HISTORY  2021    Previous Stent Placement    OTHER SURGICAL HISTORY  2021    Thoracoscopy Of The Lungs And Pleural Space    TONSILLECTOMY  2021    Tonsillectomy       Charting was completed using voice recognition technology and may include unintended errors.

## 2023-11-22 ENCOUNTER — TELEPHONE (OUTPATIENT)
Dept: PRIMARY CARE | Facility: CLINIC | Age: 88
End: 2023-11-22
Payer: MEDICARE

## 2023-11-22 NOTE — TELEPHONE ENCOUNTER
I called this pt and his spouse said he was unavaiable and to call his daughter Flor who is not on the chart. I asked her to have pt call the office back on Monday or Tueday when Dr domínguez was back. She did not take any of the below info and I need to let him know and if that's what Dr Domínguez advises      It is legal now and he can go a dispensary for CBD  or THC if he wants or to a medical marijuana provider such as the one listed below.     [facility]  5.0  (8) · Medical clinic  [phone]  Open ? Closes 10?PM

## 2023-11-22 NOTE — TELEPHONE ENCOUNTER
AT HIS LAST APPT DR CAMPBELL DISCUSSED MEDICAL MARIJUANA WITH HIM AND HE HAS DECIDED HE WOULD LIKE TO MOVE FORWARD AND TRY THIS.  HE IS ASKING HOW HE DOES THIS    PLEASE CALL PATIENT BACK

## 2023-12-11 ENCOUNTER — OFFICE VISIT (OUTPATIENT)
Dept: PRIMARY CARE | Facility: CLINIC | Age: 88
End: 2023-12-11
Payer: MEDICARE

## 2023-12-11 VITALS
BODY MASS INDEX: 23.55 KG/M2 | SYSTOLIC BLOOD PRESSURE: 90 MMHG | HEART RATE: 74 BPM | OXYGEN SATURATION: 94 % | DIASTOLIC BLOOD PRESSURE: 50 MMHG | HEIGHT: 69 IN | WEIGHT: 159 LBS

## 2023-12-11 DIAGNOSIS — R60.0 LOWER EXTREMITY EDEMA: Primary | ICD-10-CM

## 2023-12-11 PROCEDURE — 1159F MED LIST DOCD IN RCRD: CPT | Performed by: INTERNAL MEDICINE

## 2023-12-11 PROCEDURE — 99213 OFFICE O/P EST LOW 20 MIN: CPT | Performed by: INTERNAL MEDICINE

## 2023-12-11 PROCEDURE — 1036F TOBACCO NON-USER: CPT | Performed by: INTERNAL MEDICINE

## 2023-12-11 PROCEDURE — 3078F DIAST BP <80 MM HG: CPT | Performed by: INTERNAL MEDICINE

## 2023-12-11 PROCEDURE — 1160F RVW MEDS BY RX/DR IN RCRD: CPT | Performed by: INTERNAL MEDICINE

## 2023-12-11 PROCEDURE — 3074F SYST BP LT 130 MM HG: CPT | Performed by: INTERNAL MEDICINE

## 2023-12-11 NOTE — PROGRESS NOTES
Subjective   Patient ID: Donavan Hernández is a 89 y.o. male who presents for the following     Chronic disease follow up  Diana (ignacioagjeanneer RN - not present)   Flor (daughter - present)    MEDICARE WELLNESS 6/9/2023       PHYSICAL 8/8/2022     Assessment/Plan   Chronic lower extremity swelling: kerlex and abd and ace bandage. Recommend feet up.     Low bp but not symptomatic at all.   Midodrine may need to be necessary in the near future.     Chronic pain controlled with medical THC      COPD/ILD/CHronic hypoxia/immunosuppression: stable, continue home O2 3 liters, increase to 5L with exertion and continue 10mg prednisone as needed. continue on Symbicort. doing well.   Reduce to 10mg prednisone    Ckd stage 3 GFR 40-50s: Lower extremity edema: increase from 20mg to 40mg lasix for 5 days then reduce down 20mg daily     Squamous cell CA of the scalp. Please follow up with Dr King. [phone],      Chronic back pain/ thoracic compression fractures: tylenol 1000mg q 8hrs    HTN: stable, continue with diet modifications     HLD: stable, continue on statin      CAD: stable, continue to check lipid panel and follow up with cardiology     history of ITP: stable and in remission for years now. will continue to monitor plts.      GERD: stable, continue PPI      labs reviewed with patient     patient wants DNR CC at this time.            HPI    male with COPD, chronic hypoxia on 3 liters of oxygen, CAD s/p stent, htn, hld and the following    Lower extremities can leak at times. He currently is dry with a small scratch that is healing well on his anterior left shin.     BP is low but denies any syncope or near syncope. Denies any dizziness.      HLD: Patient denies any muscle aches and is tolerating statin therapy     CAD: denies any angina. denies any sob chest pain or pressure especially with exertion. 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 he follows with dr zuniga. he mentions that he does get hospitalized for lung disease. he quite in 1985 smoking, but he previously smoke 1ppd for 30 years.   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 patient went to dr kennedy for kyphoplasty but deferred on this procedure. patient currently does not take any pain relievers      ITP: required treatment back in 2013 with dr khan. he has been in remission for years.      GERD: Patient denies any acid reflux symptoms including epigastric burning, nausea or vomiting. Denies any dysphagia.     social: patient denies any smoking, drug or alcohol abuse     family history: dad passed 44 from lung cancer, mom passed old age     surgical history: s/p two lung surgeries looking for cancer which was not cancer, hernia on the right repair      HOSPITALIZATIONS  @ [facility] patient was hospitalized from October 5 to October 10, 2023 for acute exacerbation of his COPD.  He did quite well on a steroid taper.  He did not qualify for BiPAP upon discharge but did require it during his hospitalization.  He was to continue on 60 mg of prednisone for 3 days 40 mg for prednisone for 3 days then 20 mg for prednisone for 3 days and then call follow-up closely.He does not feel that he can take care of himself. Lower extremity edema and he is on 20mg lasix       @[facility] patient was hospitalized from 8/25/23-8/28/2023 for sepsis, acute on chronic hypoxemic= respiratory failure and COPD exacerbation patient was treated with steroids and antibiotics. He improved and was placed on a steroid taper 60mg,40, 20mg taper down every 3 days of prednisone and then to continue on 10mg prednisone    @ [facility] and discharged on jan 18, 2023 with COPD exacerbation on levaquin  "and po steroids and did welll. he had DARRON which improved quickly during the hospitalization. he is doing well on his portable oxygen. no complaints or ROS at this time.   Visit Vitals  BP 90/50   Pulse 74   Ht 1.753 m (5' 9\")   Wt 72.1 kg (159 lb)   SpO2 94%   BMI 23.48 kg/m²   Smoking Status Former   BSA 1.87 m²     PHYSICAL EXAM   General appearance: Alert and in no acute distress. speech is clear and coherent  HEENT: Sclera and conjunctiva white, EOMI,  n. No head trauma    Respiratory : No respiratory distress, normal respiratory rhythm and effort. Clear bilateral breath sounds. No wheezing or rhonchi.   Cardiovascular: heart rate regular, S1, S2. no murmurs. Trace to trace to +1  Lower extremity edema  Skin inspection: Normal skin color and pigmentation, normal skin turgor and no visible rash, induration, or cellulitis  MSK: 3/5 strength upper and lower extremities with gait abnormalities. no loss of muscle mass   Neuro: 2-12 CN grossly intact.  no slurred speech. no lateralizing deficit  Psychiatric Orientation: Oriented to person, place, and time. no depression, homicidal or suicidal thoughts, normal affect       REVIEW OF SYSTEMS     Constitutional:   no fever, chills or sweats.     Cardiovascular: no exertional chest pain, no palpitations,    Lungs: Denies shortness of breath, exertional dyspnea, wheezing  Gastrointestinal: no change in bowel habits, no diarrhea, no nausea,    Musculoskeletal: no myalgias, no muscle weakness    Neurological: no headaches, no seizures, no numbness, no lateralizing deficits and no fainting.   Psychiatric: no depression and no anxiety.   Urine: denies polyuria, hematuria, dysuria      No Known Allergies    Current Outpatient Medications   Medication Sig Dispense Refill    albuterol 2.5 mg /3 mL (0.083 %) nebulizer solution Inhale.      aspirin 81 mg chewable tablet 1 tablet (81 mg).      atorvastatin (Lipitor) 40 mg tablet 1 tablet (40 mg).      furosemide (Lasix) 20 mg tablet " Take 1 tablet (20 mg) by mouth once daily as needed.      mometasone (Asmanex Twisthaler) 220 mcg/ actuation (14) inhaler Inhale 2 puffs once daily. Rinse mouth with water after use to reduce aftertaste and incidence of candidiasis. Do not swallow.      pantoprazole (ProtoNix) 40 mg EC tablet Take by mouth.      predniSONE (Deltasone) 20 mg tablet 3 tablets for 3 days; 2 tabs for 3 days 1 tab for 3 days 18 tablet 1    psyllium husk (MetamuciL) 3.4 gram/5.4 gram powder Take by mouth.      tiotropium-olodateroL (Stiolto Respimat) 2.5-2.5 mcg/actuation mist inhaler       vit C,W-Um-sqjxn-lutein-zeaxan (PreserVision AREDS-2) 250-90-40-1 mg tablet,chewable Chew.       No current facility-administered medications for this visit.       Objective     No visits with results within 4 Month(s) from this visit.   Latest known visit with results is:   Lab on 06/09/2023   Component Date Value Ref Range Status    WBC 06/09/2023 10.5  4.4 - 11.3 x10E9/L Final    nRBC 06/09/2023 0.0  0.0 - 0.0 /100 WBC Final    RBC 06/09/2023 5.51  4.50 - 5.90 x10E12/L Final    Hemoglobin 06/09/2023 15.7  13.5 - 17.5 g/dL Final    Hematocrit 06/09/2023 48.3  41.0 - 52.0 % Final    MCV 06/09/2023 88  80 - 100 fL Final    MCHC 06/09/2023 32.5  32.0 - 36.0 g/dL Final    Platelets 06/09/2023 274  150 - 450 x10E9/L Final    RDW 06/09/2023 16.0 (H)  11.5 - 14.5 % Final    Glucose 06/09/2023 78  74 - 99 mg/dL Final    Sodium 06/09/2023 142  136 - 145 mmol/L Final    Potassium 06/09/2023 4.1  3.5 - 5.3 mmol/L Final    Chloride 06/09/2023 104  98 - 107 mmol/L Final    Bicarbonate 06/09/2023 31  21 - 32 mmol/L Final    Anion Gap 06/09/2023 11  10 - 20 mmol/L Final    Urea Nitrogen 06/09/2023 17  6 - 23 mg/dL Final    Creatinine 06/09/2023 0.93  0.50 - 1.30 mg/dL Final    GFR MALE 06/09/2023 77  >90 mL/min/1.73m2 Final    Calcium 06/09/2023 9.2  8.6 - 10.6 mg/dL Final    Albumin 06/09/2023 3.6  3.4 - 5.0 g/dL Final    Alkaline Phosphatase 06/09/2023 76  33 -  136 U/L Final    Total Protein 2023 6.0 (L)  6.4 - 8.2 g/dL Final    AST 2023 26  9 - 39 U/L Final    Total Bilirubin 2023 1.3 (H)  0.0 - 1.2 mg/dL Final    ALT (SGPT) 2023 23  10 - 52 U/L Final       Radiology: Reviewed imaging in powerchart.  No results found.    No family history on file.  Social History     Socioeconomic History    Marital status:      Spouse name: None    Number of children: None    Years of education: None    Highest education level: None   Occupational History    None   Tobacco Use    Smoking status: Former     Packs/day: 1     Types: Cigarettes     Quit date:      Years since quittin.9    Smokeless tobacco: Former   Substance and Sexual Activity    Alcohol use: Never    Drug use: Never    Sexual activity: None   Other Topics Concern    None   Social History Narrative    None     Social Determinants of Health     Financial Resource Strain: Not on file   Food Insecurity: Not on file   Transportation Needs: Not on file   Physical Activity: Not on file   Stress: Not on file   Social Connections: Not on file   Intimate Partner Violence: Not on file   Housing Stability: Not on file     Past Medical History:   Diagnosis Date    Personal history of other malignant neoplasm of bronchus and lung     History of malignant neoplasm of lung     Past Surgical History:   Procedure Laterality Date    FOOT SURGERY  2021    Foot Surgery    HERNIA REPAIR  2021    Inguinal Hernia Repair    KNEE SURGERY  2021    Knee Surgery    OTHER SURGICAL HISTORY  2021    Colonoscopy    OTHER SURGICAL HISTORY  2021    Previous Stent Placement    OTHER SURGICAL HISTORY  2021    Thoracoscopy Of The Lungs And Pleural Space    TONSILLECTOMY  2021    Tonsillectomy       Charting was completed using voice recognition technology and may include unintended errors.

## 2024-03-12 ENCOUNTER — APPOINTMENT (OUTPATIENT)
Dept: PRIMARY CARE | Facility: CLINIC | Age: 89
End: 2024-03-12
Payer: MEDICARE

## 2024-10-03 ENCOUNTER — TELEPHONE (OUTPATIENT)
Dept: PRIMARY CARE | Facility: CLINIC | Age: 89
End: 2024-10-03
Payer: MEDICARE